# Patient Record
Sex: FEMALE | Race: WHITE | NOT HISPANIC OR LATINO | Employment: FULL TIME | URBAN - NONMETROPOLITAN AREA
[De-identification: names, ages, dates, MRNs, and addresses within clinical notes are randomized per-mention and may not be internally consistent; named-entity substitution may affect disease eponyms.]

---

## 2022-04-21 ENCOUNTER — TELEPHONE (OUTPATIENT)
Dept: INTERNAL MEDICINE | Facility: CLINIC | Age: 32
End: 2022-04-21

## 2023-11-27 ENCOUNTER — OFFICE VISIT (OUTPATIENT)
Dept: INTERNAL MEDICINE | Facility: CLINIC | Age: 33
End: 2023-11-27
Payer: COMMERCIAL

## 2023-11-27 VITALS
HEIGHT: 65 IN | SYSTOLIC BLOOD PRESSURE: 128 MMHG | HEART RATE: 74 BPM | WEIGHT: 137 LBS | DIASTOLIC BLOOD PRESSURE: 86 MMHG | OXYGEN SATURATION: 98 % | TEMPERATURE: 98.6 F | BODY MASS INDEX: 22.82 KG/M2

## 2023-11-27 DIAGNOSIS — R05.3 CHRONIC COUGH: Primary | ICD-10-CM

## 2023-11-27 RX ORDER — LEVOFLOXACIN 500 MG/1
500 TABLET, FILM COATED ORAL DAILY
Qty: 7 TABLET | Refills: 0 | Status: SHIPPED | OUTPATIENT
Start: 2023-11-27 | End: 2023-12-04

## 2023-11-27 RX ORDER — SUCRALFATE ORAL 1 G/10ML
1 SUSPENSION ORAL 4 TIMES DAILY
Qty: 414 ML | Refills: 0 | Status: SHIPPED | OUTPATIENT
Start: 2023-11-27

## 2023-11-27 RX ORDER — FLUTICASONE PROPIONATE 50 MCG
2 SPRAY, SUSPENSION (ML) NASAL DAILY
Qty: 16 G | Refills: 11 | Status: SHIPPED | OUTPATIENT
Start: 2023-11-27

## 2023-11-29 ENCOUNTER — TELEPHONE (OUTPATIENT)
Dept: INTERNAL MEDICINE | Facility: CLINIC | Age: 33
End: 2023-11-29

## 2023-12-01 ENCOUNTER — HOSPITAL ENCOUNTER (OUTPATIENT)
Dept: CARDIOLOGY | Facility: HOSPITAL | Age: 33
Discharge: HOME | End: 2023-12-01
Payer: COMMERCIAL

## 2023-12-01 VITALS
DIASTOLIC BLOOD PRESSURE: 66 MMHG | BODY MASS INDEX: 22.81 KG/M2 | HEART RATE: 60 BPM | SYSTOLIC BLOOD PRESSURE: 95 MMHG | HEIGHT: 65 IN | WEIGHT: 136.91 LBS

## 2023-12-01 DIAGNOSIS — R07.9 CHEST PAIN, UNSPECIFIED TYPE: ICD-10-CM

## 2023-12-01 LAB
BH CV NUCLEAR PRIOR STUDY: 3
BH CV REST NUCLEAR ISOTOPE DOSE: 9.9 MCI
BH CV STRESS BP STAGE 1: NORMAL
BH CV STRESS BP STAGE 2: NORMAL
BH CV STRESS BP STAGE 3: NORMAL
BH CV STRESS BP STAGE 4: NORMAL
BH CV STRESS DURATION MIN STAGE 1: 3
BH CV STRESS DURATION MIN STAGE 2: 3
BH CV STRESS DURATION MIN STAGE 3: 3
BH CV STRESS DURATION MIN STAGE 4: 1
BH CV STRESS DURATION SEC STAGE 1: 0
BH CV STRESS DURATION SEC STAGE 2: 0
BH CV STRESS DURATION SEC STAGE 3: 0
BH CV STRESS DURATION SEC STAGE 4: 21
BH CV STRESS GRADE STAGE 1: 10
BH CV STRESS GRADE STAGE 2: 12
BH CV STRESS GRADE STAGE 3: 14
BH CV STRESS GRADE STAGE 4: 16
BH CV STRESS HR STAGE 1: 100
BH CV STRESS HR STAGE 2: 118
BH CV STRESS HR STAGE 3: 138
BH CV STRESS HR STAGE 4: 167
BH CV STRESS METS STAGE 1: 5
BH CV STRESS METS STAGE 2: 7.5
BH CV STRESS METS STAGE 3: 10
BH CV STRESS METS STAGE 4: 13.5
BH CV STRESS NUCLEAR ISOTOPE DOSE: 32.4 MCI
BH CV STRESS PROTOCOL 1: NORMAL
BH CV STRESS RECOVERY BP: NORMAL MMHG
BH CV STRESS RECOVERY HR: 85 BPM
BH CV STRESS SPEED STAGE 1: 1.7
BH CV STRESS SPEED STAGE 2: 2.5
BH CV STRESS SPEED STAGE 3: 3.4
BH CV STRESS SPEED STAGE 4: 4.2
BH CV STRESS STAGE 1: 1
BH CV STRESS STAGE 2: 2
BH CV STRESS STAGE 3: 3
BH CV STRESS STAGE 4: 4
MAXIMAL PREDICTED HEART RATE: 185 BPM
PERCENT MAX PREDICTED HR: 90.27 %
STRESS BASELINE BP: NORMAL MMHG
STRESS BASELINE HR: 60 BPM
STRESS PERCENT HR: 106 %
STRESS POST ESTIMATED WORKLOAD: 13.5 METS
STRESS POST EXERCISE DUR MIN: 10 MIN
STRESS POST EXERCISE DUR SEC: 21 SEC
STRESS POST PEAK BP: NORMAL MMHG
STRESS POST PEAK HR: 167 BPM
STRESS TARGET HR: 157 BPM

## 2023-12-01 PROCEDURE — A9502 TC99M TETROFOSMIN: HCPCS

## 2023-12-01 PROCEDURE — 0 TECHNETIUM TETROFOSMIN KIT

## 2023-12-01 PROCEDURE — 78452 HT MUSCLE IMAGE SPECT MULT: CPT

## 2023-12-01 PROCEDURE — 93017 CV STRESS TEST TRACING ONLY: CPT

## 2023-12-01 RX ADMIN — TETROFOSMIN 1 DOSE: 1.38 INJECTION, POWDER, LYOPHILIZED, FOR SOLUTION INTRAVENOUS at 09:35

## 2023-12-01 RX ADMIN — TETROFOSMIN 1 DOSE: 1.38 INJECTION, POWDER, LYOPHILIZED, FOR SOLUTION INTRAVENOUS at 07:50

## 2023-12-05 ENCOUNTER — TELEPHONE (OUTPATIENT)
Dept: INTERNAL MEDICINE | Facility: CLINIC | Age: 33
End: 2023-12-05
Payer: COMMERCIAL

## 2023-12-05 LAB
BH CV NUCLEAR PRIOR STUDY: 3
BH CV REST NUCLEAR ISOTOPE DOSE: 9.9 MCI
BH CV STRESS BP STAGE 1: NORMAL
BH CV STRESS BP STAGE 2: NORMAL
BH CV STRESS BP STAGE 3: NORMAL
BH CV STRESS BP STAGE 4: NORMAL
BH CV STRESS DURATION MIN STAGE 1: 3
BH CV STRESS DURATION MIN STAGE 2: 3
BH CV STRESS DURATION MIN STAGE 3: 3
BH CV STRESS DURATION MIN STAGE 4: 1
BH CV STRESS DURATION SEC STAGE 1: 0
BH CV STRESS DURATION SEC STAGE 2: 0
BH CV STRESS DURATION SEC STAGE 3: 0
BH CV STRESS DURATION SEC STAGE 4: 21
BH CV STRESS GRADE STAGE 1: 10
BH CV STRESS GRADE STAGE 2: 12
BH CV STRESS GRADE STAGE 3: 14
BH CV STRESS GRADE STAGE 4: 16
BH CV STRESS HR STAGE 1: 100
BH CV STRESS HR STAGE 2: 118
BH CV STRESS HR STAGE 3: 138
BH CV STRESS HR STAGE 4: 167
BH CV STRESS METS STAGE 1: 5
BH CV STRESS METS STAGE 2: 7.5
BH CV STRESS METS STAGE 3: 10
BH CV STRESS METS STAGE 4: 13.5
BH CV STRESS NUCLEAR ISOTOPE DOSE: 32.4 MCI
BH CV STRESS PROTOCOL 1: NORMAL
BH CV STRESS RECOVERY BP: NORMAL MMHG
BH CV STRESS RECOVERY HR: 85 BPM
BH CV STRESS SPEED STAGE 1: 1.7
BH CV STRESS SPEED STAGE 2: 2.5
BH CV STRESS SPEED STAGE 3: 3.4
BH CV STRESS SPEED STAGE 4: 4.2
BH CV STRESS STAGE 1: 1
BH CV STRESS STAGE 2: 2
BH CV STRESS STAGE 3: 3
BH CV STRESS STAGE 4: 4
LV EF NUC BP: 72 %
MAXIMAL PREDICTED HEART RATE: 185 BPM
PERCENT MAX PREDICTED HR: 90.27 %
STRESS BASELINE BP: NORMAL MMHG
STRESS BASELINE HR: 60 BPM
STRESS PERCENT HR: 106 %
STRESS POST ESTIMATED WORKLOAD: 13.5 METS
STRESS POST EXERCISE DUR MIN: 10 MIN
STRESS POST EXERCISE DUR SEC: 21 SEC
STRESS POST PEAK BP: NORMAL MMHG
STRESS POST PEAK HR: 167 BPM
STRESS TARGET HR: 157 BPM

## 2023-12-15 ENCOUNTER — OFFICE VISIT (OUTPATIENT)
Dept: INTERNAL MEDICINE | Facility: CLINIC | Age: 33
End: 2023-12-15
Payer: COMMERCIAL

## 2023-12-15 VITALS
OXYGEN SATURATION: 99 % | TEMPERATURE: 98.7 F | HEART RATE: 70 BPM | RESPIRATION RATE: 18 BRPM | WEIGHT: 137 LBS | SYSTOLIC BLOOD PRESSURE: 112 MMHG | HEIGHT: 65 IN | DIASTOLIC BLOOD PRESSURE: 71 MMHG | BODY MASS INDEX: 22.82 KG/M2

## 2023-12-15 DIAGNOSIS — R42 DIZZINESS: ICD-10-CM

## 2023-12-15 DIAGNOSIS — R07.9 CHEST PAIN, UNSPECIFIED TYPE: Primary | ICD-10-CM

## 2023-12-15 DIAGNOSIS — J40 BRONCHITIS: ICD-10-CM

## 2023-12-15 PROCEDURE — 99214 OFFICE O/P EST MOD 30 MIN: CPT

## 2023-12-15 RX ORDER — ALBUTEROL SULFATE 90 UG/1
2 AEROSOL, METERED RESPIRATORY (INHALATION) EVERY 4 HOURS PRN
Qty: 18 G | Refills: 1 | Status: SHIPPED | OUTPATIENT
Start: 2023-12-15

## 2023-12-15 RX ORDER — PREDNISONE 20 MG/1
20 TABLET ORAL DAILY
Qty: 5 TABLET | Refills: 0 | Status: SHIPPED | OUTPATIENT
Start: 2023-12-15

## 2024-01-26 ENCOUNTER — OFFICE VISIT (OUTPATIENT)
Dept: CARDIOLOGY | Facility: CLINIC | Age: 34
End: 2024-01-26
Payer: COMMERCIAL

## 2024-01-26 VITALS
SYSTOLIC BLOOD PRESSURE: 108 MMHG | DIASTOLIC BLOOD PRESSURE: 72 MMHG | HEIGHT: 65 IN | BODY MASS INDEX: 22.33 KG/M2 | WEIGHT: 134 LBS | HEART RATE: 62 BPM

## 2024-01-26 DIAGNOSIS — R07.89 OTHER CHEST PAIN: Primary | ICD-10-CM

## 2024-01-26 PROCEDURE — 1159F MED LIST DOCD IN RCRD: CPT | Performed by: EMERGENCY MEDICINE

## 2024-01-26 PROCEDURE — 99204 OFFICE O/P NEW MOD 45 MIN: CPT | Performed by: EMERGENCY MEDICINE

## 2024-01-26 PROCEDURE — 1160F RVW MEDS BY RX/DR IN RCRD: CPT | Performed by: EMERGENCY MEDICINE

## 2024-01-26 RX ORDER — AZITHROMYCIN 250 MG/1
TABLET, FILM COATED ORAL SEE ADMIN INSTRUCTIONS
COMMUNITY
Start: 2024-01-22

## 2024-01-26 RX ORDER — METHYLPREDNISOLONE 4 MG/1
TABLET ORAL
COMMUNITY
Start: 2024-01-25

## 2024-02-01 PROBLEM — R07.89 OTHER CHEST PAIN: Status: ACTIVE | Noted: 2024-02-01

## 2024-04-03 ENCOUNTER — OFFICE VISIT (OUTPATIENT)
Dept: INTERNAL MEDICINE | Facility: CLINIC | Age: 34
End: 2024-04-03
Payer: COMMERCIAL

## 2024-04-03 VITALS
RESPIRATION RATE: 16 BRPM | BODY MASS INDEX: 23.53 KG/M2 | DIASTOLIC BLOOD PRESSURE: 73 MMHG | WEIGHT: 141.2 LBS | HEIGHT: 65 IN | HEART RATE: 71 BPM | SYSTOLIC BLOOD PRESSURE: 106 MMHG | TEMPERATURE: 99.1 F | OXYGEN SATURATION: 100 %

## 2024-04-03 DIAGNOSIS — M54.32 SCIATICA, LEFT SIDE: Primary | ICD-10-CM

## 2024-04-03 RX ORDER — METHYLPREDNISOLONE SODIUM SUCCINATE 40 MG/ML
60 INJECTION, POWDER, LYOPHILIZED, FOR SOLUTION INTRAMUSCULAR; INTRAVENOUS ONCE
Status: COMPLETED | OUTPATIENT
Start: 2024-04-03 | End: 2024-04-03

## 2024-04-03 RX ADMIN — METHYLPREDNISOLONE SODIUM SUCCINATE 60 MG: 40 INJECTION, POWDER, LYOPHILIZED, FOR SOLUTION INTRAMUSCULAR; INTRAVENOUS at 15:39

## 2024-06-07 ENCOUNTER — OFFICE VISIT (OUTPATIENT)
Dept: INTERNAL MEDICINE | Facility: CLINIC | Age: 34
End: 2024-06-07
Payer: COMMERCIAL

## 2024-06-07 VITALS
HEART RATE: 62 BPM | TEMPERATURE: 99.3 F | DIASTOLIC BLOOD PRESSURE: 62 MMHG | OXYGEN SATURATION: 100 % | RESPIRATION RATE: 17 BRPM | WEIGHT: 130 LBS | SYSTOLIC BLOOD PRESSURE: 101 MMHG | HEIGHT: 65 IN | BODY MASS INDEX: 21.66 KG/M2

## 2024-06-07 DIAGNOSIS — F41.8 SITUATIONAL ANXIETY: Primary | ICD-10-CM

## 2024-06-07 DIAGNOSIS — Z79.899 LONG TERM USE OF DRUG: ICD-10-CM

## 2024-06-07 PROCEDURE — 1125F AMNT PAIN NOTED PAIN PRSNT: CPT

## 2024-06-07 PROCEDURE — 1159F MED LIST DOCD IN RCRD: CPT

## 2024-06-07 PROCEDURE — 1160F RVW MEDS BY RX/DR IN RCRD: CPT

## 2024-06-07 PROCEDURE — 99213 OFFICE O/P EST LOW 20 MIN: CPT

## 2024-06-07 RX ORDER — CLONAZEPAM 0.5 MG/1
0.5 TABLET ORAL 2 TIMES DAILY PRN
Qty: 60 TABLET | Refills: 0 | Status: SHIPPED | OUTPATIENT
Start: 2024-06-07

## 2024-06-18 LAB — DRUGS UR: NORMAL

## 2024-08-06 ENCOUNTER — OFFICE VISIT (OUTPATIENT)
Dept: INTERNAL MEDICINE | Facility: CLINIC | Age: 34
End: 2024-08-06
Payer: COMMERCIAL

## 2024-08-06 VITALS
DIASTOLIC BLOOD PRESSURE: 69 MMHG | SYSTOLIC BLOOD PRESSURE: 103 MMHG | BODY MASS INDEX: 21.83 KG/M2 | OXYGEN SATURATION: 99 % | WEIGHT: 131 LBS | HEIGHT: 65 IN | TEMPERATURE: 98 F | RESPIRATION RATE: 18 BRPM | HEART RATE: 63 BPM

## 2024-08-06 DIAGNOSIS — E53.8 VITAMIN B12 DEFICIENCY: ICD-10-CM

## 2024-08-06 DIAGNOSIS — E55.9 VITAMIN D DEFICIENCY: ICD-10-CM

## 2024-08-06 DIAGNOSIS — Z00.00 ROUTINE GENERAL MEDICAL EXAMINATION AT A HEALTH CARE FACILITY: Primary | ICD-10-CM

## 2024-08-06 PROCEDURE — 1159F MED LIST DOCD IN RCRD: CPT

## 2024-08-06 PROCEDURE — 2014F MENTAL STATUS ASSESS: CPT

## 2024-08-06 PROCEDURE — 99395 PREV VISIT EST AGE 18-39: CPT

## 2024-08-06 PROCEDURE — 1125F AMNT PAIN NOTED PAIN PRSNT: CPT

## 2024-08-06 PROCEDURE — 1160F RVW MEDS BY RX/DR IN RCRD: CPT

## 2024-08-07 LAB
25(OH)D3+25(OH)D2 SERPL-MCNC: 46.6 NG/ML (ref 30–100)
ALBUMIN SERPL-MCNC: 4.4 G/DL (ref 3.9–4.9)
ALP SERPL-CCNC: 58 IU/L (ref 44–121)
ALT SERPL-CCNC: 8 IU/L (ref 0–32)
AST SERPL-CCNC: 13 IU/L (ref 0–40)
BASOPHILS # BLD AUTO: 0.1 X10E3/UL (ref 0–0.2)
BASOPHILS NFR BLD AUTO: 1 %
BILIRUB SERPL-MCNC: 0.5 MG/DL (ref 0–1.2)
BUN SERPL-MCNC: 7 MG/DL (ref 6–20)
BUN/CREAT SERPL: 12 (ref 9–23)
CALCIUM SERPL-MCNC: 9.6 MG/DL (ref 8.7–10.2)
CHLORIDE SERPL-SCNC: 103 MMOL/L (ref 96–106)
CHOLEST SERPL-MCNC: 138 MG/DL (ref 100–199)
CO2 SERPL-SCNC: 24 MMOL/L (ref 20–29)
CREAT SERPL-MCNC: 0.59 MG/DL (ref 0.57–1)
EGFRCR SERPLBLD CKD-EPI 2021: 120 ML/MIN/1.73
EOSINOPHIL # BLD AUTO: 0.1 X10E3/UL (ref 0–0.4)
EOSINOPHIL NFR BLD AUTO: 1 %
ERYTHROCYTE [DISTWIDTH] IN BLOOD BY AUTOMATED COUNT: 12.8 % (ref 11.7–15.4)
GLOBULIN SER CALC-MCNC: 2.5 G/DL (ref 1.5–4.5)
GLUCOSE SERPL-MCNC: 87 MG/DL (ref 70–99)
HBA1C MFR BLD: 5.4 % (ref 4.8–5.6)
HCT VFR BLD AUTO: 38.4 % (ref 34–46.6)
HDLC SERPL-MCNC: 53 MG/DL
HGB BLD-MCNC: 12.5 G/DL (ref 11.1–15.9)
IMM GRANULOCYTES # BLD AUTO: 0 X10E3/UL (ref 0–0.1)
IMM GRANULOCYTES NFR BLD AUTO: 0 %
LDLC SERPL CALC-MCNC: 72 MG/DL (ref 0–99)
LYMPHOCYTES # BLD AUTO: 1.9 X10E3/UL (ref 0.7–3.1)
LYMPHOCYTES NFR BLD AUTO: 30 %
MCH RBC QN AUTO: 30.3 PG (ref 26.6–33)
MCHC RBC AUTO-ENTMCNC: 32.6 G/DL (ref 31.5–35.7)
MCV RBC AUTO: 93 FL (ref 79–97)
MONOCYTES # BLD AUTO: 0.5 X10E3/UL (ref 0.1–0.9)
MONOCYTES NFR BLD AUTO: 8 %
NEUTROPHILS # BLD AUTO: 3.7 X10E3/UL (ref 1.4–7)
NEUTROPHILS NFR BLD AUTO: 60 %
PLATELET # BLD AUTO: 233 X10E3/UL (ref 150–450)
POTASSIUM SERPL-SCNC: 4.2 MMOL/L (ref 3.5–5.2)
PROT SERPL-MCNC: 6.9 G/DL (ref 6–8.5)
RBC # BLD AUTO: 4.12 X10E6/UL (ref 3.77–5.28)
SODIUM SERPL-SCNC: 140 MMOL/L (ref 134–144)
T4 SERPL-MCNC: 6.8 UG/DL (ref 4.5–12)
TRIGL SERPL-MCNC: 62 MG/DL (ref 0–149)
TSH SERPL DL<=0.005 MIU/L-ACNC: 0.9 UIU/ML (ref 0.45–4.5)
VIT B12 SERPL-MCNC: 354 PG/ML (ref 232–1245)
VLDLC SERPL CALC-MCNC: 13 MG/DL (ref 5–40)
WBC # BLD AUTO: 6.2 X10E3/UL (ref 3.4–10.8)

## 2024-09-04 ENCOUNTER — OFFICE VISIT (OUTPATIENT)
Dept: INTERNAL MEDICINE | Facility: CLINIC | Age: 34
End: 2024-09-04

## 2024-09-04 VITALS
HEIGHT: 65 IN | WEIGHT: 127 LBS | DIASTOLIC BLOOD PRESSURE: 85 MMHG | BODY MASS INDEX: 21.16 KG/M2 | HEART RATE: 64 BPM | OXYGEN SATURATION: 100 % | SYSTOLIC BLOOD PRESSURE: 116 MMHG | TEMPERATURE: 98.6 F | RESPIRATION RATE: 17 BRPM

## 2024-09-04 DIAGNOSIS — L72.9 SKIN CYST: Primary | ICD-10-CM

## 2024-09-04 PROCEDURE — 1125F AMNT PAIN NOTED PAIN PRSNT: CPT

## 2024-09-04 PROCEDURE — 99213 OFFICE O/P EST LOW 20 MIN: CPT

## 2024-09-04 RX ORDER — DOXYCYCLINE 100 MG/1
100 CAPSULE ORAL 2 TIMES DAILY
Qty: 20 CAPSULE | Refills: 0 | Status: SHIPPED | OUTPATIENT
Start: 2024-09-04 | End: 2024-09-14

## 2024-11-14 ENCOUNTER — OFFICE VISIT (OUTPATIENT)
Dept: INTERNAL MEDICINE | Facility: CLINIC | Age: 34
End: 2024-11-14
Payer: COMMERCIAL

## 2024-11-14 VITALS
OXYGEN SATURATION: 98 % | WEIGHT: 131 LBS | BODY MASS INDEX: 21.83 KG/M2 | SYSTOLIC BLOOD PRESSURE: 123 MMHG | RESPIRATION RATE: 17 BRPM | HEIGHT: 65 IN | DIASTOLIC BLOOD PRESSURE: 79 MMHG | HEART RATE: 70 BPM

## 2024-11-14 DIAGNOSIS — Z20.2 STD EXPOSURE: Primary | ICD-10-CM

## 2024-11-14 PROCEDURE — 1125F AMNT PAIN NOTED PAIN PRSNT: CPT

## 2024-11-14 PROCEDURE — 99213 OFFICE O/P EST LOW 20 MIN: CPT

## 2024-11-14 NOTE — PROGRESS NOTES
Subjective     Chief Complaint   Patient presents with    Exposure to STD       History of Present Illness  History of Present Illness  The patient presents for evaluation of sexually transmitted disease.    She reports that her former partner, a , tested positive for a sexually transmitted disease, either chlamydia or syphilis, although she is uncertain which one. States that he did not contact her however the doctors facility contacted her yesterday to inform her. She denies any vaginal symptoms, no lesions, or abnormal discharge. No pelvic pain.       Patient's PMR from outside medical facility reviewed and noted.    Review of Systems   Genitourinary:  Negative for genital sores, vaginal bleeding, vaginal discharge and vaginal pain.        Otherwise complete ROS reviewed and negative except as mentioned in the HPI.    Past Medical History:   Past Medical History:   Diagnosis Date    7 weeks gestation of pregnancy     Anemia in pregnancy     Anxiety     BMI less than 19,adult     Contraception     Encounter for gynecological examination with Papanicolaou smear of cervix     Implanon removal     Less than 8 weeks gestation of pregnancy     Smoker     Supervision of other normal pregnancy, antepartum      Past Surgical History:  Past Surgical History:   Procedure Laterality Date    BREAST SURGERY      DILATATION AND CURETTAGE      of uterus    TUBAL ABDOMINAL LIGATION  2017    TUBAL COAGULATION LAPAROSCOPIC Bilateral 05/16/2017    Procedure: BILATERAL FALLOPIAN TUBE RING BANDING LAPAROSCOPIC;  Surgeon: Annie Johns MD;  Location: [facility];  Service:      Social History:  reports that she quit smoking about 8 years ago. Her smoking use included cigarettes. She started smoking about 10 years ago. She has a 2 pack-year smoking history. She has been exposed to tobacco smoke. She has never used smokeless tobacco. She reports that she does not drink alcohol and does not use drugs.    Family  "History: family history includes Cancer in her father; No Known Problems in her mother; Other in her sister; Stroke in her sister.      Allergies:  No Known Allergies  Medications:  Prior to Admission medications    Medication Sig Start Date End Date Taking? Authorizing Provider   albuterol sulfate  (90 Base) MCG/ACT inhaler Inhale 2 puffs Every 4 (Four) Hours As Needed for Wheezing. 12/15/23  Yes Karen Maldonado APRN   fluticasone (FLONASE) 50 MCG/ACT nasal spray 2 sprays into the nostril(s) as directed by provider Daily. 11/27/23  Yes Ankit Webb MD   Norethin-Eth Estrad-Fe Biphas (LO LOESTRIN FE) 1 MG-10 MCG / 10 MCG tablet Take 1 tablet by mouth Daily. 7/18/23  Yes Wandy Almeida APRN       VICKIE:        PHQ-9 Depression Screening  Little interest or pleasure in doing things?     Feeling down, depressed, or hopeless?     PHQ-2 Total Score     Trouble falling or staying asleep, or sleeping too much?     Feeling tired or having little energy?     Poor appetite or overeating?     Feeling bad about yourself - or that you are a failure or have let yourself or your family down?     Trouble concentrating on things, such as reading the newspaper or watching television?     Moving or speaking so slowly that other people could have noticed? Or the opposite - being so fidgety or restless that you have been moving around a lot more than usual?     Thoughts that you would be better off dead, or of hurting yourself in some way?     PHQ-9 Total Score     If you checked off any problems, how difficult have these problems made it for you to do your work, take care of things at home, or get along with other people?             Objective     Vital Signs: /79 (BP Location: Right arm, Patient Position: Sitting, Cuff Size: Adult)   Pulse 70   Resp 17   Ht 165.1 cm (65\")   Wt 59.4 kg (131 lb)   SpO2 98%   BMI 21.80 kg/m²   Physical Exam  Vitals and nursing note reviewed.   Constitutional:       " General: She is not in acute distress.     Appearance: Normal appearance. She is not ill-appearing.   HENT:      Head: Normocephalic and atraumatic.      Right Ear: External ear normal.      Left Ear: External ear normal.      Nose: Nose normal.      Mouth/Throat:      Mouth: Mucous membranes are moist.      Pharynx: No posterior oropharyngeal erythema.   Eyes:      General: No scleral icterus.     Extraocular Movements: Extraocular movements intact.      Conjunctiva/sclera: Conjunctivae normal.      Pupils: Pupils are equal, round, and reactive to light.   Cardiovascular:      Rate and Rhythm: Normal rate and regular rhythm.      Pulses: Normal pulses.      Heart sounds: Normal heart sounds.   Pulmonary:      Effort: Pulmonary effort is normal. No respiratory distress.      Breath sounds: Normal breath sounds. No wheezing.   Abdominal:      General: Abdomen is flat. Bowel sounds are normal.      Palpations: Abdomen is soft.      Tenderness: There is no abdominal tenderness.   Musculoskeletal:         General: Normal range of motion.      Cervical back: Normal range of motion.      Right lower leg: No edema.      Left lower leg: No edema.   Skin:     General: Skin is warm and dry.   Neurological:      General: No focal deficit present.      Mental Status: She is alert and oriented to person, place, and time. Mental status is at baseline.      Motor: No weakness.   Psychiatric:         Mood and Affect: Mood normal.         Behavior: Behavior normal.         Thought Content: Thought content normal.         Judgment: Judgment normal.       BMI is within normal parameters. No other follow-up for BMI required.      Advance Care Planning   ACP discussion was held with the patient during this visit.         Results Reviewed:  Glucose   Date Value Ref Range Status   08/06/2024 87 70 - 99 mg/dL Final   12/02/2020 88 65 - 99 mg/dL Final   03/22/2019 86 74 - 109 mg/dL Final     BUN   Date Value Ref Range Status   08/06/2024 7  6 - 20 mg/dL Final   12/02/2020 5 (L) 6 - 20 mg/dL Final   03/22/2019 6 6 - 20 mg/dL Final     Creatinine   Date Value Ref Range Status   08/06/2024 0.59 0.57 - 1.00 mg/dL Final   12/02/2020 0.45 (L) 0.57 - 1.00 mg/dL Final   03/22/2019 <0.5 0.5 - 0.9 mg/dL Final     Sodium   Date Value Ref Range Status   08/06/2024 140 134 - 144 mmol/L Final   12/02/2020 137 136 - 145 mmol/L Final   03/22/2019 138 136 - 145 mmol/L Final     Potassium   Date Value Ref Range Status   08/06/2024 4.2 3.5 - 5.2 mmol/L Final   12/02/2020 3.8 3.5 - 5.2 mmol/L Final   03/22/2019 4.4 3.5 - 5.0 mmol/L Final     Chloride   Date Value Ref Range Status   08/06/2024 103 96 - 106 mmol/L Final   12/02/2020 102 98 - 107 mmol/L Final   03/22/2019 101 98 - 111 mmol/L Final     CO2   Date Value Ref Range Status   12/02/2020 26.0 22.0 - 29.0 mmol/L Final   03/22/2019 26 22 - 29 mmol/L Final     Total CO2   Date Value Ref Range Status   08/06/2024 24 20 - 29 mmol/L Final     Calcium   Date Value Ref Range Status   08/06/2024 9.6 8.7 - 10.2 mg/dL Final   12/02/2020 10.0 8.6 - 10.5 mg/dL Final   03/22/2019 9.3 8.6 - 10.0 mg/dL Final     ALT (SGPT)   Date Value Ref Range Status   08/06/2024 8 0 - 32 IU/L Final   12/02/2020 9 1 - 33 U/L Final   03/22/2019 10 5 - 33 U/L Final     AST (SGOT)   Date Value Ref Range Status   08/06/2024 13 0 - 40 IU/L Final   12/02/2020 15 1 - 32 U/L Final   03/22/2019 11 5 - 32 U/L Final     WBC   Date Value Ref Range Status   08/06/2024 6.2 3.4 - 10.8 x10E3/uL Final   03/22/2019 6.9 4.8 - 10.8 K/uL Final     Hematocrit   Date Value Ref Range Status   08/06/2024 38.4 34.0 - 46.6 % Final   12/02/2020 39.6 34.0 - 46.6 % Final   03/22/2019 39.7 37.0 - 47.0 % Final     Platelets   Date Value Ref Range Status   08/06/2024 233 150 - 450 x10E3/uL Final   12/02/2020 243 140 - 450 10*3/mm3 Final   03/22/2019 245 130 - 400 K/uL Final     Triglycerides   Date Value Ref Range Status   08/06/2024 62 0 - 149 mg/dL Final     HDL Cholesterol    Date Value Ref Range Status   08/06/2024 53 >39 mg/dL Final     LDL Chol Calc (NIH)   Date Value Ref Range Status   08/06/2024 72 0 - 99 mg/dL Final     Hemoglobin A1C   Date Value Ref Range Status   08/06/2024 5.4 4.8 - 5.6 % Final     Comment:              Prediabetes: 5.7 - 6.4           Diabetes: >6.4           Glycemic control for adults with diabetes: <7.0           Assessment / Plan     Assessment/Plan:    1. STD exposure  - Chlamydia trachomatis, Neisseria gonorrhoeae, Trichomonas vaginalis, PCR - Urine, Urine, Clean Catch  - HIV-1/O/2 Ag/Ab w Reflex  - HSV 1/2, PCR (Non-CSF) - Blood, Arm, Right  - RPR  - Hepatitis C Antibody    Diagnoses and all orders for this visit:    1. STD exposure (Primary)  -     Chlamydia trachomatis, Neisseria gonorrhoeae, Trichomonas vaginalis, PCR - Urine, Urine, Clean Catch  -     HIV-1/O/2 Ag/Ab w Reflex  -     HSV 1/2, PCR (Non-CSF) - Blood, Arm, Right  -     RPR  -     Hepatitis C Antibody        Assessment & Plan  1. Suspected sexually transmitted infection.  The patient reports a history of her partner testing positive for an unspecified sexually transmitted infection. She is unsure whether it was chlamydia or syphilis. She has no symptoms or lesions currently. Tests for herpes, syphilis, and HIV will be conducted to confirm the diagnosis. Pending the results of the tests, appropriate treatment will be initiated.      No follow-ups on file. unless patient needs to be seen sooner or acute issues arise.      I have discussed the patient results/orders and and plan/recommendation with them at today's visit.    Patient or patient representative verbalized consent for the use of Ambient Listening during the visit with  FORD Carr for chart documentation. 11/14/2024  13:41 CST  FORD Carr   11/14/2024

## 2024-11-19 ENCOUNTER — TELEPHONE (OUTPATIENT)
Dept: INTERNAL MEDICINE | Facility: CLINIC | Age: 34
End: 2024-11-19
Payer: COMMERCIAL

## 2024-11-19 DIAGNOSIS — A59.9 TRICHOMONAS INFECTION: Primary | ICD-10-CM

## 2024-11-19 LAB
C TRACH RRNA SPEC QL NAA+PROBE: NEGATIVE
HCV IGG SERPL QL IA: NON REACTIVE
HIV 1+2 AB+HIV1 P24 AG SERPL QL IA: NON REACTIVE
HSV1 DNA SPEC QL NAA+PROBE: NEGATIVE
HSV2 DNA SPEC QL NAA+PROBE: NEGATIVE
N GONORRHOEA RRNA SPEC QL NAA+PROBE: NEGATIVE
RPR SER QL: NON REACTIVE
T VAGINALIS RRNA SPEC QL NAA+PROBE: POSITIVE

## 2024-11-19 RX ORDER — METRONIDAZOLE 500 MG/1
500 TABLET ORAL 2 TIMES DAILY
Qty: 14 TABLET | Refills: 0 | Status: SHIPPED | OUTPATIENT
Start: 2024-11-19 | End: 2024-11-26

## 2025-01-07 ENCOUNTER — OFFICE VISIT (OUTPATIENT)
Dept: INTERNAL MEDICINE | Facility: CLINIC | Age: 35
End: 2025-01-07
Payer: COMMERCIAL

## 2025-01-07 VITALS
RESPIRATION RATE: 18 BRPM | OXYGEN SATURATION: 99 % | HEIGHT: 65 IN | SYSTOLIC BLOOD PRESSURE: 106 MMHG | HEART RATE: 83 BPM | DIASTOLIC BLOOD PRESSURE: 71 MMHG | BODY MASS INDEX: 21.33 KG/M2 | WEIGHT: 128 LBS

## 2025-01-07 DIAGNOSIS — Z20.2 STD EXPOSURE: Primary | ICD-10-CM

## 2025-01-07 PROCEDURE — 1125F AMNT PAIN NOTED PAIN PRSNT: CPT

## 2025-01-07 PROCEDURE — 1160F RVW MEDS BY RX/DR IN RCRD: CPT

## 2025-01-07 PROCEDURE — 99213 OFFICE O/P EST LOW 20 MIN: CPT

## 2025-01-07 PROCEDURE — 1159F MED LIST DOCD IN RCRD: CPT

## 2025-01-07 NOTE — PROGRESS NOTES
Subjective     Chief Complaint   Patient presents with   • Exposure to STD       Exposure to STD   The patient's pertinent negatives include no pelvic pain.     History of Present Illness  The patient presents for a recheck.    She is seeking a retest for STD, following up.  Was diagnosed and treated for trichomonas on 11/14/2024, has returned today to get retested to ensure resolution. Denies any symptoms.     Patient's PMR from outside medical facility reviewed and noted.    Review of Systems   Genitourinary:  Negative for decreased urine volume, hematuria, menstrual problem, pelvic pain, vaginal bleeding, vaginal discharge and vaginal pain.        Otherwise complete ROS reviewed and negative except as mentioned in the HPI.    Past Medical History:   Past Medical History:   Diagnosis Date   • 7 weeks gestation of pregnancy    • Anemia in pregnancy    • Anxiety    • BMI less than 19,adult    • Contraception    • Encounter for gynecological examination with Papanicolaou smear of cervix    • Implanon removal    • Less than 8 weeks gestation of pregnancy    • Smoker    • Supervision of other normal pregnancy, antepartum      Past Surgical History:  Past Surgical History:   Procedure Laterality Date   • BREAST SURGERY     • DILATATION AND CURETTAGE      of uterus   • TUBAL ABDOMINAL LIGATION  2017   • TUBAL COAGULATION LAPAROSCOPIC Bilateral 05/16/2017    Procedure: BILATERAL FALLOPIAN TUBE RING BANDING LAPAROSCOPIC;  Surgeon: Annie Johns MD;  Location: [facility];  Service:      Social History:  reports that she quit smoking about 8 years ago. Her smoking use included cigarettes. She started smoking about 10 years ago. She has a 2 pack-year smoking history. She has been exposed to tobacco smoke. She has never used smokeless tobacco. She reports that she does not drink alcohol and does not use drugs.    Family History: family history includes Cancer in her father; No Known Problems in her mother; Other in  "her sister; Stroke in her sister.      Allergies:  No Known Allergies  Medications:  Prior to Admission medications    Medication Sig Start Date End Date Taking? Authorizing Provider   albuterol sulfate  (90 Base) MCG/ACT inhaler Inhale 2 puffs Every 4 (Four) Hours As Needed for Wheezing. 12/15/23  Yes Karen Maldonado APRN   fluticasone (FLONASE) 50 MCG/ACT nasal spray 2 sprays into the nostril(s) as directed by provider Daily. 11/27/23  Yes Ankit Webb MD   Norethin-Eth Estrad-Fe Biphas (LO LOESTRIN FE) 1 MG-10 MCG / 10 MCG tablet Take 1 tablet by mouth Daily. 7/18/23  Yes Wandy Almeida APRN       VICKIE:        PHQ-9 Depression Screening  Little interest or pleasure in doing things? Not at all   Feeling down, depressed, or hopeless? Not at all   PHQ-2 Total Score 0   Trouble falling or staying asleep, or sleeping too much?     Feeling tired or having little energy?     Poor appetite or overeating?     Feeling bad about yourself - or that you are a failure or have let yourself or your family down?     Trouble concentrating on things, such as reading the newspaper or watching television?     Moving or speaking so slowly that other people could have noticed? Or the opposite - being so fidgety or restless that you have been moving around a lot more than usual?     Thoughts that you would be better off dead, or of hurting yourself in some way?     PHQ-9 Total Score     If you checked off any problems, how difficult have these problems made it for you to do your work, take care of things at home, or get along with other people? Not difficult at all       PHQ-9 Total Score:     0 (Negative screening for depression)  Support given, observe for worsening symptoms    Objective     Vital Signs: /71 (BP Location: Right arm, Patient Position: Sitting, Cuff Size: Adult)   Pulse 83   Resp 18   Ht 165.1 cm (65\")   Wt 58.1 kg (128 lb)   SpO2 99%   BMI 21.30 kg/m²   Physical Exam  Constitutional:  "      General: She is not in acute distress.     Appearance: Normal appearance. She is normal weight. She is not ill-appearing.   HENT:      Head: Normocephalic and atraumatic.      Nose: Nose normal.      Mouth/Throat:      Mouth: Mucous membranes are moist.      Pharynx: No posterior oropharyngeal erythema.   Eyes:      General: No scleral icterus.     Extraocular Movements: Extraocular movements intact.      Conjunctiva/sclera: Conjunctivae normal.      Pupils: Pupils are equal, round, and reactive to light.   Cardiovascular:      Rate and Rhythm: Normal rate and regular rhythm.      Pulses: Normal pulses.      Heart sounds: Normal heart sounds.   Pulmonary:      Effort: Pulmonary effort is normal. No respiratory distress.      Breath sounds: Normal breath sounds. No wheezing.   Abdominal:      General: Abdomen is flat. Bowel sounds are normal.      Palpations: Abdomen is soft.      Tenderness: There is no abdominal tenderness.   Musculoskeletal:         General: Normal range of motion.      Cervical back: Normal range of motion.      Right lower leg: No edema.      Left lower leg: No edema.   Skin:     General: Skin is warm and dry.      Findings: No erythema or rash.   Neurological:      General: No focal deficit present.      Mental Status: She is alert and oriented to person, place, and time. Mental status is at baseline.      Motor: No weakness.   Psychiatric:         Mood and Affect: Mood normal.         Behavior: Behavior normal.         Thought Content: Thought content normal.         Judgment: Judgment normal.       BMI is within normal parameters. No other follow-up for BMI required.      Advance Care Planning   ACP discussion was held with the patient during this visit.         Results Reviewed:  Glucose   Date Value Ref Range Status   08/06/2024 87 70 - 99 mg/dL Final   12/02/2020 88 65 - 99 mg/dL Final   03/22/2019 86 74 - 109 mg/dL Final     BUN   Date Value Ref Range Status   08/06/2024 7 6 - 20  mg/dL Final   12/02/2020 5 (L) 6 - 20 mg/dL Final   03/22/2019 6 6 - 20 mg/dL Final     Creatinine   Date Value Ref Range Status   08/06/2024 0.59 0.57 - 1.00 mg/dL Final   12/02/2020 0.45 (L) 0.57 - 1.00 mg/dL Final   03/22/2019 <0.5 0.5 - 0.9 mg/dL Final     Sodium   Date Value Ref Range Status   08/06/2024 140 134 - 144 mmol/L Final   12/02/2020 137 136 - 145 mmol/L Final   03/22/2019 138 136 - 145 mmol/L Final     Potassium   Date Value Ref Range Status   08/06/2024 4.2 3.5 - 5.2 mmol/L Final   12/02/2020 3.8 3.5 - 5.2 mmol/L Final   03/22/2019 4.4 3.5 - 5.0 mmol/L Final     Chloride   Date Value Ref Range Status   08/06/2024 103 96 - 106 mmol/L Final   12/02/2020 102 98 - 107 mmol/L Final   03/22/2019 101 98 - 111 mmol/L Final     CO2   Date Value Ref Range Status   12/02/2020 26.0 22.0 - 29.0 mmol/L Final   03/22/2019 26 22 - 29 mmol/L Final     Total CO2   Date Value Ref Range Status   08/06/2024 24 20 - 29 mmol/L Final     Calcium   Date Value Ref Range Status   08/06/2024 9.6 8.7 - 10.2 mg/dL Final   12/02/2020 10.0 8.6 - 10.5 mg/dL Final   03/22/2019 9.3 8.6 - 10.0 mg/dL Final     ALT (SGPT)   Date Value Ref Range Status   08/06/2024 8 0 - 32 IU/L Final   12/02/2020 9 1 - 33 U/L Final   03/22/2019 10 5 - 33 U/L Final     AST (SGOT)   Date Value Ref Range Status   08/06/2024 13 0 - 40 IU/L Final   12/02/2020 15 1 - 32 U/L Final   03/22/2019 11 5 - 32 U/L Final     WBC   Date Value Ref Range Status   08/06/2024 6.2 3.4 - 10.8 x10E3/uL Final   03/22/2019 6.9 4.8 - 10.8 K/uL Final     Hematocrit   Date Value Ref Range Status   08/06/2024 38.4 34.0 - 46.6 % Final   12/02/2020 39.6 34.0 - 46.6 % Final   03/22/2019 39.7 37.0 - 47.0 % Final     Platelets   Date Value Ref Range Status   08/06/2024 233 150 - 450 x10E3/uL Final   12/02/2020 243 140 - 450 10*3/mm3 Final   03/22/2019 245 130 - 400 K/uL Final     Triglycerides   Date Value Ref Range Status   08/06/2024 62 0 - 149 mg/dL Final     HDL Cholesterol   Date  Value Ref Range Status   08/06/2024 53 >39 mg/dL Final     LDL Chol Calc (NIH)   Date Value Ref Range Status   08/06/2024 72 0 - 99 mg/dL Final     Hemoglobin A1C   Date Value Ref Range Status   08/06/2024 5.4 4.8 - 5.6 % Final     Comment:              Prediabetes: 5.7 - 6.4           Diabetes: >6.4           Glycemic control for adults with diabetes: <7.0           Assessment / Plan     Assessment/Plan:    1. STD exposure  - Chlamydia trachomatis, Neisseria gonorrhoeae, Trichomonas vaginalis, PCR - Swab, Urine, Clean Catch    Diagnoses and all orders for this visit:    1. STD exposure (Primary)  -     Cancel: Chlamydia trachomatis, Neisseria gonorrhoeae, Trichomonas vaginalis, PCR - Swab, Urine, Clean Catch  -     Chlamydia trachomatis, Neisseria gonorrhoeae, Trichomonas vaginalis, PCR - Swab, Urine, Clean Catch        Assessment & Plan        No follow-ups on file. unless patient needs to be seen sooner or acute issues arise.      I have discussed the patient results/orders and and plan/recommendation with them at today's visit.    Patient or patient representative verbalized consent for the use of Ambient Listening during the visit with  FORD Carr for chart documentation. 1/8/2025  14:35 CST  FORD Carr   01/07/2025

## 2025-01-10 LAB
C TRACH RRNA SPEC QL NAA+PROBE: NEGATIVE
N GONORRHOEA RRNA SPEC QL NAA+PROBE: NEGATIVE
T VAGINALIS RRNA SPEC QL NAA+PROBE: NEGATIVE

## 2025-02-07 ENCOUNTER — TELEPHONE (OUTPATIENT)
Dept: INTERNAL MEDICINE | Facility: CLINIC | Age: 35
End: 2025-02-07

## 2025-02-07 ENCOUNTER — OFFICE VISIT (OUTPATIENT)
Dept: INTERNAL MEDICINE | Facility: CLINIC | Age: 35
End: 2025-02-07
Payer: COMMERCIAL

## 2025-02-07 VITALS
OXYGEN SATURATION: 100 % | DIASTOLIC BLOOD PRESSURE: 82 MMHG | HEART RATE: 63 BPM | RESPIRATION RATE: 17 BRPM | SYSTOLIC BLOOD PRESSURE: 112 MMHG | WEIGHT: 135.8 LBS | BODY MASS INDEX: 22.63 KG/M2 | HEIGHT: 65 IN

## 2025-02-07 DIAGNOSIS — N93.9 VAGINAL BLEEDING: Primary | ICD-10-CM

## 2025-02-07 DIAGNOSIS — M79.89 LEG SWELLING: ICD-10-CM

## 2025-02-07 DIAGNOSIS — N89.8 VAGINAL ODOR: ICD-10-CM

## 2025-02-07 PROCEDURE — 1159F MED LIST DOCD IN RCRD: CPT

## 2025-02-07 PROCEDURE — 1125F AMNT PAIN NOTED PAIN PRSNT: CPT

## 2025-02-07 PROCEDURE — 1160F RVW MEDS BY RX/DR IN RCRD: CPT

## 2025-02-07 PROCEDURE — 99214 OFFICE O/P EST MOD 30 MIN: CPT

## 2025-02-07 NOTE — PROGRESS NOTES
Subjective     Chief Complaint   Patient presents with    Edema     Legs. Knee down.     Menstrual Problem       History of Present Illness  History of Present Illness  The patient presents for evaluation of lower extremity edema and abnormal menstrual bleeding.    She reports experiencing significant swelling in her legs and feet, which she describes as normally thin. This swelling occurs both during her workday and at night, causing discomfort due to the tightness of her shoes and socks. The onset of the swelling was noted last week, and it is particularly pronounced during her work hours when she is on her feet constantly. She also experiences this swelling after prolonged periods of sitting, such as during long car rides. The swelling typically subsides within a few hours of elevating her legs. She has not been using compression stockings. Despite maintaining adequate hydration and a low-sodium diet, the swelling persists. She also reports a sensation of extreme tightness in her calves, describing it as feeling like they might burst. She has not been engaging in any physical exercise recently. She reports no shortness of breath or chest pain but notes a slight weight gain. She also reports increased urinary frequency.    She has been experiencing an unusual menstrual cycle, characterized by intermittent bleeding over a two-week period. The bleeding initially presented as a normal menstrual flow for a few days before ceasing and then resuming as a dark-colored discharge with an unpleasant odor. She reports no pelvic pain, abdominal pain, or cramping. She has not taken a pregnancy test. She has had one new sexual partner. She discontinued her birth control regimen last year and underwent tubal ligation in 04/2024.    Supplemental Information  She reports persistent numbness and tingling in her arm, which she attributes to sleeping on it. She maintains a side-sleeping position throughout the  night.    Patient's PMR from outside medical facility reviewed and noted.    Review of Systems     Otherwise complete ROS reviewed and negative except as mentioned in the HPI.    Past Medical History:   Past Medical History:   Diagnosis Date    7 weeks gestation of pregnancy     Anemia in pregnancy     Anxiety     BMI less than 19,adult     Contraception     Encounter for gynecological examination with Papanicolaou smear of cervix     Implanon removal     Less than 8 weeks gestation of pregnancy     Smoker     Supervision of other normal pregnancy, antepartum      Past Surgical History:  Past Surgical History:   Procedure Laterality Date    BREAST SURGERY      DILATATION AND CURETTAGE      of uterus    TUBAL ABDOMINAL LIGATION  2017    TUBAL COAGULATION LAPAROSCOPIC Bilateral 05/16/2017    Procedure: BILATERAL FALLOPIAN TUBE RING BANDING LAPAROSCOPIC;  Surgeon: Annie Johns MD;  Location: [facility];  Service:      Social History:  reports that she quit smoking about 8 years ago. Her smoking use included cigarettes. She started smoking about 10 years ago. She has a 2 pack-year smoking history. She has been exposed to tobacco smoke. She has never used smokeless tobacco. She reports that she does not drink alcohol and does not use drugs.    Family History: family history includes Cancer in her father; No Known Problems in her mother; Other in her sister; Stroke in her sister.      Allergies:  No Known Allergies  Medications:  Prior to Admission medications    Medication Sig Start Date End Date Taking? Authorizing Provider   albuterol sulfate  (90 Base) MCG/ACT inhaler Inhale 2 puffs Every 4 (Four) Hours As Needed for Wheezing. 12/15/23  Yes Karen Maldonado APRN   fluticasone (FLONASE) 50 MCG/ACT nasal spray 2 sprays into the nostril(s) as directed by provider Daily. 11/27/23  Yes Ankit Webb MD   Norethin-Eth Estrad-Fe Biphas (LO LOESTRIN FE) 1 MG-10 MCG / 10 MCG tablet Take 1 tablet by mouth  "Daily.  Patient not taking: Reported on 2/7/2025 7/18/23   Elle Wandy L, FORD       VICKIE:        PHQ-9 Depression Screening  Little interest or pleasure in doing things?     Feeling down, depressed, or hopeless?     PHQ-2 Total Score     Trouble falling or staying asleep, or sleeping too much?     Feeling tired or having little energy?     Poor appetite or overeating?     Feeling bad about yourself - or that you are a failure or have let yourself or your family down?     Trouble concentrating on things, such as reading the newspaper or watching television?     Moving or speaking so slowly that other people could have noticed? Or the opposite - being so fidgety or restless that you have been moving around a lot more than usual?     Thoughts that you would be better off dead, or of hurting yourself in some way?     PHQ-9 Total Score     If you checked off any problems, how difficult have these problems made it for you to do your work, take care of things at home, or get along with other people?         Objective     Vital Signs: /82 (BP Location: Right arm, Patient Position: Sitting, Cuff Size: Adult)   Pulse 63   Resp 17   Ht 165.1 cm (65\")   Wt 61.6 kg (135 lb 12.8 oz)   SpO2 100%   BMI 22.60 kg/m²   Physical Exam  Vitals and nursing note reviewed.   Constitutional:       Appearance: Normal appearance.   HENT:      Head: Normocephalic.      Right Ear: External ear normal.      Left Ear: External ear normal.      Mouth/Throat:      Mouth: Mucous membranes are moist.   Eyes:      General: No scleral icterus.  Cardiovascular:      Rate and Rhythm: Normal rate and regular rhythm.      Pulses: Normal pulses.      Heart sounds: Normal heart sounds.   Pulmonary:      Effort: Pulmonary effort is normal.      Breath sounds: Normal breath sounds.   Musculoskeletal:         General: No swelling. Normal range of motion.      Right lower leg: No edema.      Left lower leg: No edema.   Neurological:      General: No " focal deficit present.      Mental Status: She is alert and oriented to person, place, and time.   Psychiatric:         Mood and Affect: Mood normal.         Behavior: Behavior normal.         BMI is within normal parameters. No other follow-up for BMI required.      Advance Care Planning   ACP discussion was held with the patient during this visit.         Results Reviewed:  Glucose   Date Value Ref Range Status   08/06/2024 87 70 - 99 mg/dL Final   12/02/2020 88 65 - 99 mg/dL Final   03/22/2019 86 74 - 109 mg/dL Final     BUN   Date Value Ref Range Status   08/06/2024 7 6 - 20 mg/dL Final   12/02/2020 5 (L) 6 - 20 mg/dL Final   03/22/2019 6 6 - 20 mg/dL Final     Creatinine   Date Value Ref Range Status   08/06/2024 0.59 0.57 - 1.00 mg/dL Final   12/02/2020 0.45 (L) 0.57 - 1.00 mg/dL Final   03/22/2019 <0.5 0.5 - 0.9 mg/dL Final     Sodium   Date Value Ref Range Status   08/06/2024 140 134 - 144 mmol/L Final   12/02/2020 137 136 - 145 mmol/L Final   03/22/2019 138 136 - 145 mmol/L Final     Potassium   Date Value Ref Range Status   08/06/2024 4.2 3.5 - 5.2 mmol/L Final   12/02/2020 3.8 3.5 - 5.2 mmol/L Final   03/22/2019 4.4 3.5 - 5.0 mmol/L Final     Chloride   Date Value Ref Range Status   08/06/2024 103 96 - 106 mmol/L Final   12/02/2020 102 98 - 107 mmol/L Final   03/22/2019 101 98 - 111 mmol/L Final     CO2   Date Value Ref Range Status   12/02/2020 26.0 22.0 - 29.0 mmol/L Final   03/22/2019 26 22 - 29 mmol/L Final     Total CO2   Date Value Ref Range Status   08/06/2024 24 20 - 29 mmol/L Final     Calcium   Date Value Ref Range Status   08/06/2024 9.6 8.7 - 10.2 mg/dL Final   12/02/2020 10.0 8.6 - 10.5 mg/dL Final   03/22/2019 9.3 8.6 - 10.0 mg/dL Final     ALT (SGPT)   Date Value Ref Range Status   08/06/2024 8 0 - 32 IU/L Final   12/02/2020 9 1 - 33 U/L Final   03/22/2019 10 5 - 33 U/L Final     AST (SGOT)   Date Value Ref Range Status   08/06/2024 13 0 - 40 IU/L Final   12/02/2020 15 1 - 32 U/L Final    03/22/2019 11 5 - 32 U/L Final     WBC   Date Value Ref Range Status   08/06/2024 6.2 3.4 - 10.8 x10E3/uL Final   03/22/2019 6.9 4.8 - 10.8 K/uL Final     Hematocrit   Date Value Ref Range Status   08/06/2024 38.4 34.0 - 46.6 % Final   12/02/2020 39.6 34.0 - 46.6 % Final   03/22/2019 39.7 37.0 - 47.0 % Final     Platelets   Date Value Ref Range Status   08/06/2024 233 150 - 450 x10E3/uL Final   12/02/2020 243 140 - 450 10*3/mm3 Final   03/22/2019 245 130 - 400 K/uL Final     Triglycerides   Date Value Ref Range Status   08/06/2024 62 0 - 149 mg/dL Final     HDL Cholesterol   Date Value Ref Range Status   08/06/2024 53 >39 mg/dL Final     LDL Chol Calc (NIH)   Date Value Ref Range Status   08/06/2024 72 0 - 99 mg/dL Final     Hemoglobin A1C   Date Value Ref Range Status   08/06/2024 5.4 4.8 - 5.6 % Final     Comment:              Prediabetes: 5.7 - 6.4           Diabetes: >6.4           Glycemic control for adults with diabetes: <7.0           Assessment / Plan     Assessment/Plan:    1. Vaginal bleeding  - Bacterial Vaginosis, RASHAD - Swab, Vagina    2. Vaginal odor  - Bacterial Vaginosis, RASHAD - Swab, Vagina    3. Leg swelling  - CBC w AUTO Differential  - Comprehensive metabolic panel  - BNP ([facility] Only)    Diagnoses and all orders for this visit:    1. Vaginal bleeding (Primary)  -     Bacterial Vaginosis, RASHAD - Swab, Vagina    2. Vaginal odor  -     Bacterial Vaginosis, RASHAD - Swab, Vagina    3. Leg swelling  -     CBC w AUTO Differential  -     Comprehensive metabolic panel  -     BNP ([facility] Only)        Assessment & Plan  1. Lower extremity edema.  A comprehensive evaluation of her fluid markers will be conducted. She is advised to procure compression stockings and utilize them during her work hours.    2. Abnormal menstrual bleeding.  A swab test for bacterial vaginosis will be performed. Additionally, a kidney function test and other relevant laboratory tests will be conducted. If the swab test yields  negative results and the symptoms persist, a transvaginal ultrasound will be considered.    PROCEDURE  The patient underwent tubal ligation in 04/2024.    No follow-ups on file. unless patient needs to be seen sooner or acute issues arise.      I have discussed the patient results/orders and and plan/recommendation with them at today's visit.    Patient or patient representative verbalized consent for the use of Ambient Listening during the visit with  FORD Carr for chart documentation. 2/20/2025  12:52 CST  FORD Carr   02/07/2025

## 2025-02-07 NOTE — TELEPHONE ENCOUNTER
PATIENT HAS BEEN SWELLING IN LEGS AND FEET FOR A WEEK AND HER LEFT ARM FEELS TIGHT.    I SCHEDULED HER FOR APPT AT 1:45 WITH KIRAN VILLAFANA

## 2025-02-09 LAB
ALBUMIN SERPL-MCNC: 4.4 G/DL (ref 3.9–4.9)
ALP SERPL-CCNC: 70 IU/L (ref 44–121)
ALT SERPL-CCNC: 9 IU/L (ref 0–32)
AST SERPL-CCNC: 13 IU/L (ref 0–40)
BASOPHILS # BLD AUTO: 0.1 X10E3/UL (ref 0–0.2)
BASOPHILS NFR BLD AUTO: 1 %
BILIRUB SERPL-MCNC: 0.3 MG/DL (ref 0–1.2)
BUN SERPL-MCNC: 10 MG/DL (ref 6–20)
BUN/CREAT SERPL: 15 (ref 9–23)
CALCIUM SERPL-MCNC: 9.6 MG/DL (ref 8.7–10.2)
CHLORIDE SERPL-SCNC: 104 MMOL/L (ref 96–106)
CO2 SERPL-SCNC: 26 MMOL/L (ref 20–29)
CREAT SERPL-MCNC: 0.65 MG/DL (ref 0.57–1)
EGFRCR SERPLBLD CKD-EPI 2021: 117 ML/MIN/1.73
EOSINOPHIL # BLD AUTO: 0.1 X10E3/UL (ref 0–0.4)
EOSINOPHIL NFR BLD AUTO: 1 %
ERYTHROCYTE [DISTWIDTH] IN BLOOD BY AUTOMATED COUNT: 12.6 % (ref 11.7–15.4)
GLOBULIN SER CALC-MCNC: 2.4 G/DL (ref 1.5–4.5)
GLUCOSE SERPL-MCNC: 81 MG/DL (ref 70–99)
HCT VFR BLD AUTO: 39.1 % (ref 34–46.6)
HGB BLD-MCNC: 13 G/DL (ref 11.1–15.9)
IMM GRANULOCYTES # BLD AUTO: 0 X10E3/UL (ref 0–0.1)
IMM GRANULOCYTES NFR BLD AUTO: 0 %
LYMPHOCYTES # BLD AUTO: 2.2 X10E3/UL (ref 0.7–3.1)
LYMPHOCYTES NFR BLD AUTO: 30 %
MCH RBC QN AUTO: 30.7 PG (ref 26.6–33)
MCHC RBC AUTO-ENTMCNC: 33.2 G/DL (ref 31.5–35.7)
MCV RBC AUTO: 92 FL (ref 79–97)
MONOCYTES # BLD AUTO: 0.5 X10E3/UL (ref 0.1–0.9)
MONOCYTES NFR BLD AUTO: 7 %
NEUTROPHILS # BLD AUTO: 4.4 X10E3/UL (ref 1.4–7)
NEUTROPHILS NFR BLD AUTO: 61 %
PLATELET # BLD AUTO: 255 X10E3/UL (ref 150–450)
POTASSIUM SERPL-SCNC: 4.5 MMOL/L (ref 3.5–5.2)
PROT SERPL-MCNC: 6.8 G/DL (ref 6–8.5)
RBC # BLD AUTO: 4.23 X10E6/UL (ref 3.77–5.28)
SODIUM SERPL-SCNC: 141 MMOL/L (ref 134–144)
WBC # BLD AUTO: 7.3 X10E3/UL (ref 3.4–10.8)

## 2025-02-10 LAB
A VAGINAE DNA VAG QL NAA+PROBE: ABNORMAL SCORE
BVAB2 DNA VAG QL NAA+PROBE: ABNORMAL SCORE
MEGA1 DNA VAG QL NAA+PROBE: ABNORMAL SCORE

## 2025-02-11 DIAGNOSIS — B96.89 BACTERIAL VAGINOSIS: Primary | ICD-10-CM

## 2025-02-11 DIAGNOSIS — N76.0 BACTERIAL VAGINOSIS: Primary | ICD-10-CM

## 2025-02-11 RX ORDER — METRONIDAZOLE 500 MG/1
500 TABLET ORAL 2 TIMES DAILY
Qty: 14 TABLET | Refills: 0 | Status: SHIPPED | OUTPATIENT
Start: 2025-02-11 | End: 2025-02-18

## 2025-07-22 ENCOUNTER — OFFICE VISIT (OUTPATIENT)
Dept: INTERNAL MEDICINE | Facility: CLINIC | Age: 35
End: 2025-07-22
Payer: COMMERCIAL

## 2025-07-22 VITALS
HEIGHT: 65 IN | OXYGEN SATURATION: 99 % | TEMPERATURE: 99.1 F | SYSTOLIC BLOOD PRESSURE: 102 MMHG | RESPIRATION RATE: 18 BRPM | DIASTOLIC BLOOD PRESSURE: 70 MMHG | HEART RATE: 71 BPM | BODY MASS INDEX: 21.66 KG/M2 | WEIGHT: 130 LBS

## 2025-07-22 DIAGNOSIS — M79.10 MUSCLE ACHE: ICD-10-CM

## 2025-07-22 DIAGNOSIS — R53.83 OTHER FATIGUE: Primary | ICD-10-CM

## 2025-07-22 LAB
BILIRUB BLD-MCNC: NEGATIVE MG/DL
CLARITY, POC: CLEAR
COLOR UR: YELLOW
GLUCOSE UR STRIP-MCNC: NEGATIVE MG/DL
KETONES UR QL: NEGATIVE
LEUKOCYTE EST, POC: NEGATIVE
NITRITE UR-MCNC: NEGATIVE MG/ML
PH UR: 6.5 [PH] (ref 5–8)
PROT UR STRIP-MCNC: NEGATIVE MG/DL
RBC # UR STRIP: NEGATIVE /UL
SP GR UR: 1.02 (ref 1–1.03)
UROBILINOGEN UR QL: NORMAL

## 2025-07-22 NOTE — PROGRESS NOTES
Subjective     Chief Complaint   Patient presents with    Fatigue     Symptoms started 3 days ago.     Hot Flashes    Headache       Fatigue  Symptoms: fatigue, headaches, numbness, sore throat and weakness    Headache    Associated symptoms: fatigue, headaches, rhinorrhea, sore throat and weakness      History of Present Illness  The patient presents for evaluation of heat exhaustion.    She reports feeling drained after spending the weekend at a soccer tournament in [place], where she was exposed to high temperatures. She experiences headaches and fatigue, which are exacerbated by heat. Her symptoms began on Saturday night after spending the entire day outdoors. She also reports excessive sweating, which is unusual for her. She has been consuming water and orange juice but does not believe she drank enough over the weekend. Despite this, she continues to urinate normally. She is not experiencing any chest pain, speech issues, or numbness or tingling on the side of her face. Her condition remains unchanged from yesterday.    Additionally, she mentions pain in her arm and leg, similar to the sensation of sleeping on a limb. She sleeps on her right side and uses her left hand primarily for writing.    Patient's PMR from outside medical facility reviewed and noted.    Review of Systems   Constitutional:  Positive for fatigue.   HENT:  Positive for rhinorrhea and sore throat.    Neurological:  Positive for weakness, numbness and headaches.        Otherwise complete ROS reviewed and negative except as mentioned in the HPI.    Past Medical History:   Past Medical History:   Diagnosis Date    7 weeks gestation of pregnancy     Anemia in pregnancy     Anxiety     BMI less than 19,adult     Contraception     Encounter for gynecological examination with Papanicolaou smear of cervix     Endometriosis     Implanon removal     Less than 8 weeks gestation of pregnancy     Migraine     Have them often    Multiple  gestation     5 births 6 pregnancies    Ovarian cyst     Smoker     Spinal headache     Did in 2006 after birth    Supervision of other normal pregnancy, antepartum     Varicella      Past Surgical History:  Past Surgical History:   Procedure Laterality Date    BREAST SURGERY      D & C WITH SUCTION      DILATATION AND CURETTAGE      of uterus    TUBAL ABDOMINAL LIGATION  2017    TUBAL COAGULATION LAPAROSCOPIC Bilateral 05/16/2017    Procedure: BILATERAL FALLOPIAN TUBE RING BANDING LAPAROSCOPIC;  Surgeon: Annie Johns MD;  Location: [facility];  Service:      Social History:  reports that she quit smoking about 9 years ago. Her smoking use included cigarettes. She started smoking about 11 years ago. She has a 2 pack-year smoking history. She has been exposed to tobacco smoke. She has never used smokeless tobacco. She reports that she does not drink alcohol and does not use drugs.    Family History: family history includes Cancer in her father; No Known Problems in her mother; Other in her sister; Stroke in her sister.      Allergies:  No Known Allergies  Medications:  Prior to Admission medications    Medication Sig Start Date End Date Taking? Authorizing Provider   albuterol sulfate  (90 Base) MCG/ACT inhaler Inhale 2 puffs Every 4 (Four) Hours As Needed for Wheezing. 12/15/23  Yes Karen Maldonado APRN   fluticasone (FLONASE) 50 MCG/ACT nasal spray 2 sprays into the nostril(s) as directed by provider Daily. 11/27/23  Yes Ankit Webb MD       VICKIE:        PHQ-9 Depression Screening  Little interest or pleasure in doing things?     Feeling down, depressed, or hopeless?     PHQ-2 Total Score     Trouble falling or staying asleep, or sleeping too much?     Feeling tired or having little energy?     Poor appetite or overeating?     Feeling bad about yourself - or that you are a failure or have let yourself or your family down?     Trouble concentrating on things, such as reading the newspaper or  "watching television?     Moving or speaking so slowly that other people could have noticed? Or the opposite - being so fidgety or restless that you have been moving around a lot more than usual?     Thoughts that you would be better off dead, or of hurting yourself in some way?     PHQ-9 Total Score     If you checked off any problems, how difficult have these problems made it for you to do your work, take care of things at home, or get along with other people?           Objective     Vital Signs: /70   Pulse 71   Temp 99.1 °F (37.3 °C) (Skin)   Resp 18   Ht 165.1 cm (65\")   Wt 59 kg (130 lb)   SpO2 99%   BMI 21.63 kg/m²   Physical Exam  Constitutional:       General: She is not in acute distress.     Appearance: Normal appearance. She is normal weight. She is not ill-appearing.   HENT:      Head: Normocephalic and atraumatic.      Right Ear: Tympanic membrane normal.      Left Ear: Tympanic membrane normal.      Nose: Nose normal.      Mouth/Throat:      Mouth: Mucous membranes are moist.      Pharynx: No posterior oropharyngeal erythema.   Eyes:      General: No scleral icterus.     Extraocular Movements: Extraocular movements intact.      Conjunctiva/sclera: Conjunctivae normal.      Pupils: Pupils are equal, round, and reactive to light.   Cardiovascular:      Rate and Rhythm: Normal rate and regular rhythm.      Pulses: Normal pulses.      Heart sounds: Normal heart sounds.   Pulmonary:      Effort: Pulmonary effort is normal. No respiratory distress.      Breath sounds: Normal breath sounds. No wheezing.   Abdominal:      General: Abdomen is flat. Bowel sounds are normal.      Palpations: Abdomen is soft.      Tenderness: There is no abdominal tenderness.   Musculoskeletal:         General: Normal range of motion.      Cervical back: Normal range of motion.      Right lower leg: No edema.      Left lower leg: No edema.   Skin:     General: Skin is warm and dry.      Findings: No erythema or rash. "   Neurological:      General: No focal deficit present.      Mental Status: She is alert and oriented to person, place, and time. Mental status is at baseline.      Sensory: Sensation is intact.      Motor: No weakness.      Gait: Gait is intact.   Psychiatric:         Mood and Affect: Mood normal.         Behavior: Behavior normal.         Thought Content: Thought content normal.         Judgment: Judgment normal.         BMI is within normal parameters. No other follow-up for BMI required.      Advance Care Planning   ACP discussion was held with the patient during this visit.         Results Reviewed:  Glucose   Date Value Ref Range Status   02/07/2025 81 70 - 99 mg/dL Final   12/02/2020 88 65 - 99 mg/dL Final   03/22/2019 86 74 - 109 mg/dL Final     BUN   Date Value Ref Range Status   02/07/2025 10 6 - 20 mg/dL Final   12/02/2020 5 (L) 6 - 20 mg/dL Final   03/22/2019 6 6 - 20 mg/dL Final     Creatinine   Date Value Ref Range Status   02/07/2025 0.65 0.57 - 1.00 mg/dL Final   12/02/2020 0.45 (L) 0.57 - 1.00 mg/dL Final   03/22/2019 <0.5 0.5 - 0.9 mg/dL Final     Sodium   Date Value Ref Range Status   02/07/2025 141 134 - 144 mmol/L Final   12/02/2020 137 136 - 145 mmol/L Final   03/22/2019 138 136 - 145 mmol/L Final     Potassium   Date Value Ref Range Status   02/07/2025 4.5 3.5 - 5.2 mmol/L Final   12/02/2020 3.8 3.5 - 5.2 mmol/L Final   03/22/2019 4.4 3.5 - 5.0 mmol/L Final     Chloride   Date Value Ref Range Status   02/07/2025 104 96 - 106 mmol/L Final   12/02/2020 102 98 - 107 mmol/L Final   03/22/2019 101 98 - 111 mmol/L Final     CO2   Date Value Ref Range Status   12/02/2020 26.0 22.0 - 29.0 mmol/L Final   03/22/2019 26 22 - 29 mmol/L Final     Total CO2   Date Value Ref Range Status   02/07/2025 26 20 - 29 mmol/L Final     Calcium   Date Value Ref Range Status   02/07/2025 9.6 8.7 - 10.2 mg/dL Final   12/02/2020 10.0 8.6 - 10.5 mg/dL Final   03/22/2019 9.3 8.6 - 10.0 mg/dL Final     ALT (SGPT)   Date  Value Ref Range Status   02/07/2025 9 0 - 32 IU/L Final   12/02/2020 9 1 - 33 U/L Final   03/22/2019 10 5 - 33 U/L Final     AST (SGOT)   Date Value Ref Range Status   02/07/2025 13 0 - 40 IU/L Final   12/02/2020 15 1 - 32 U/L Final   03/22/2019 11 5 - 32 U/L Final     WBC   Date Value Ref Range Status   02/07/2025 7.3 3.4 - 10.8 x10E3/uL Final   03/22/2019 6.9 4.8 - 10.8 K/uL Final     Hematocrit   Date Value Ref Range Status   02/07/2025 39.1 34.0 - 46.6 % Final   12/02/2020 39.6 34.0 - 46.6 % Final   03/22/2019 39.7 37.0 - 47.0 % Final     Platelets   Date Value Ref Range Status   02/07/2025 255 150 - 450 x10E3/uL Final   12/02/2020 243 140 - 450 10*3/mm3 Final   03/22/2019 245 130 - 400 K/uL Final     Triglycerides   Date Value Ref Range Status   08/06/2024 62 0 - 149 mg/dL Final     HDL Cholesterol   Date Value Ref Range Status   08/06/2024 53 >39 mg/dL Final     LDL Chol Calc (NIH)   Date Value Ref Range Status   08/06/2024 72 0 - 99 mg/dL Final     Hemoglobin A1C   Date Value Ref Range Status   08/06/2024 5.4 4.8 - 5.6 % Final     Comment:              Prediabetes: 5.7 - 6.4           Diabetes: >6.4           Glycemic control for adults with diabetes: <7.0           Assessment / Plan     Assessment/Plan:    1. Other fatigue  - CBC w AUTO Differential  - Comprehensive metabolic panel  - CK  - POCT urinalysis dipstick, multipro    2. Muscle ache  - CK  - POCT urinalysis dipstick, multipro    Diagnoses and all orders for this visit:    1. Other fatigue (Primary)  -     Cancel: CBC w AUTO Differential  -     Cancel: Comprehensive metabolic panel  -     CBC w AUTO Differential  -     Comprehensive metabolic panel  -     CK  -     POCT urinalysis dipstick, multipro    2. Muscle ache  -     CK  -     POCT urinalysis dipstick, multipro        Assessment & Plan  1. Heat exhaustion.  - Symptoms of headache, fatigue, and muscle aches suggest heat exhaustion.  - Reported being out in the heat during a soccer tournament  and not drinking enough fluids.  - Laboratory tests will be conducted to assess kidney function and check for muscle breakdown (CK levels).  - Advised to increase fluid intake, including water and electrolyte solutions like Liquid IV.    2. Muscle pain.  - Reports pain in the left arm and leg, describing it as a dead feeling similar to when one sleeps on a limb.  - No chest pain, speech issues, or numbness/tingling on the face.  - A CK test will be performed to check for muscle breakdown.  - Symptoms could be related to dehydration and heat exhaustion.    No follow-ups on file. unless patient needs to be seen sooner or acute issues arise.      I have discussed the patient results/orders and and plan/recommendation with them at today's visit.    Patient or patient representative verbalized consent for the use of Ambient Listening during the visit with  FORD Carr for chart documentation. 7/22/2025  15:38 CDT  FORD Carr   07/22/2025

## 2025-07-24 LAB
ALBUMIN SERPL-MCNC: 4.5 G/DL (ref 3.9–4.9)
ALP SERPL-CCNC: 67 IU/L (ref 44–121)
ALT SERPL-CCNC: 9 IU/L (ref 0–32)
AST SERPL-CCNC: 15 IU/L (ref 0–40)
BASOPHILS # BLD AUTO: 0.1 X10E3/UL (ref 0–0.2)
BASOPHILS NFR BLD AUTO: 1 %
BILIRUB SERPL-MCNC: 0.6 MG/DL (ref 0–1.2)
BUN SERPL-MCNC: 10 MG/DL (ref 6–20)
BUN/CREAT SERPL: 14 (ref 9–23)
CALCIUM SERPL-MCNC: 9.7 MG/DL (ref 8.7–10.2)
CHLORIDE SERPL-SCNC: 100 MMOL/L (ref 96–106)
CK SERPL-CCNC: 57 U/L (ref 32–182)
CO2 SERPL-SCNC: 25 MMOL/L (ref 20–29)
CREAT SERPL-MCNC: 0.72 MG/DL (ref 0.57–1)
EGFRCR SERPLBLD CKD-EPI 2021: 110 ML/MIN/1.73
EOSINOPHIL # BLD AUTO: 0.2 X10E3/UL (ref 0–0.4)
EOSINOPHIL NFR BLD AUTO: 2 %
ERYTHROCYTE [DISTWIDTH] IN BLOOD BY AUTOMATED COUNT: 12.3 % (ref 11.7–15.4)
GLOBULIN SER CALC-MCNC: 2.8 G/DL (ref 1.5–4.5)
GLUCOSE SERPL-MCNC: 86 MG/DL (ref 70–99)
HCT VFR BLD AUTO: 43.2 % (ref 34–46.6)
HGB BLD-MCNC: 14 G/DL (ref 11.1–15.9)
IMM GRANULOCYTES # BLD AUTO: 0 X10E3/UL (ref 0–0.1)
IMM GRANULOCYTES NFR BLD AUTO: 0 %
LYMPHOCYTES # BLD AUTO: 2.5 X10E3/UL (ref 0.7–3.1)
LYMPHOCYTES NFR BLD AUTO: 24 %
MCH RBC QN AUTO: 30.9 PG (ref 26.6–33)
MCHC RBC AUTO-ENTMCNC: 32.4 G/DL (ref 31.5–35.7)
MCV RBC AUTO: 95 FL (ref 79–97)
MONOCYTES # BLD AUTO: 0.8 X10E3/UL (ref 0.1–0.9)
MONOCYTES NFR BLD AUTO: 8 %
NEUTROPHILS # BLD AUTO: 6.7 X10E3/UL (ref 1.4–7)
NEUTROPHILS NFR BLD AUTO: 65 %
PLATELET # BLD AUTO: 258 X10E3/UL (ref 150–450)
POTASSIUM SERPL-SCNC: 4.2 MMOL/L (ref 3.5–5.2)
PROT SERPL-MCNC: 7.3 G/DL (ref 6–8.5)
RBC # BLD AUTO: 4.53 X10E6/UL (ref 3.77–5.28)
SODIUM SERPL-SCNC: 139 MMOL/L (ref 134–144)
WBC # BLD AUTO: 10.3 X10E3/UL (ref 3.4–10.8)

## 2025-08-20 ENCOUNTER — APPOINTMENT (OUTPATIENT)
Dept: CT IMAGING | Facility: HOSPITAL | Age: 35
End: 2025-08-20
Payer: COMMERCIAL

## 2025-08-20 ENCOUNTER — APPOINTMENT (OUTPATIENT)
Dept: GENERAL RADIOLOGY | Facility: HOSPITAL | Age: 35
End: 2025-08-20
Payer: COMMERCIAL

## 2025-08-20 ENCOUNTER — TELEPHONE (OUTPATIENT)
Dept: INTERNAL MEDICINE | Facility: CLINIC | Age: 35
End: 2025-08-20
Payer: COMMERCIAL

## 2025-08-20 ENCOUNTER — HOSPITAL ENCOUNTER (EMERGENCY)
Facility: HOSPITAL | Age: 35
Discharge: HOME | End: 2025-08-20
Attending: FAMILY MEDICINE | Admitting: FAMILY MEDICINE
Payer: COMMERCIAL

## 2025-08-20 ENCOUNTER — TELEPHONE (OUTPATIENT)
Age: 35
End: 2025-08-20
Payer: COMMERCIAL

## 2025-08-22 ENCOUNTER — OFFICE VISIT (OUTPATIENT)
Dept: INTERNAL MEDICINE | Facility: CLINIC | Age: 35
End: 2025-08-22
Payer: COMMERCIAL

## 2025-08-22 VITALS
OXYGEN SATURATION: 100 % | TEMPERATURE: 98.6 F | DIASTOLIC BLOOD PRESSURE: 69 MMHG | HEART RATE: 62 BPM | BODY MASS INDEX: 21.03 KG/M2 | SYSTOLIC BLOOD PRESSURE: 102 MMHG | WEIGHT: 134 LBS | HEIGHT: 67 IN

## 2025-08-22 DIAGNOSIS — R20.2 NUMBNESS AND TINGLING OF LEFT UPPER EXTREMITY: Primary | ICD-10-CM

## 2025-08-22 DIAGNOSIS — R20.0 NUMBNESS AND TINGLING OF LEFT UPPER EXTREMITY: Primary | ICD-10-CM
